# Patient Record
Sex: MALE | Race: WHITE | NOT HISPANIC OR LATINO | Employment: FULL TIME | URBAN - METROPOLITAN AREA
[De-identification: names, ages, dates, MRNs, and addresses within clinical notes are randomized per-mention and may not be internally consistent; named-entity substitution may affect disease eponyms.]

---

## 2021-03-25 ENCOUNTER — IMMUNIZATIONS (OUTPATIENT)
Dept: FAMILY MEDICINE CLINIC | Facility: HOSPITAL | Age: 41
End: 2021-03-25

## 2021-03-25 DIAGNOSIS — Z23 ENCOUNTER FOR IMMUNIZATION: Primary | ICD-10-CM

## 2021-03-25 PROCEDURE — 0001A SARS-COV-2 / COVID-19 MRNA VACCINE (PFIZER-BIONTECH) 30 MCG: CPT

## 2021-03-25 PROCEDURE — 91300 SARS-COV-2 / COVID-19 MRNA VACCINE (PFIZER-BIONTECH) 30 MCG: CPT

## 2021-04-15 ENCOUNTER — IMMUNIZATIONS (OUTPATIENT)
Dept: FAMILY MEDICINE CLINIC | Facility: HOSPITAL | Age: 41
End: 2021-04-15

## 2021-04-15 DIAGNOSIS — Z23 ENCOUNTER FOR IMMUNIZATION: Primary | ICD-10-CM

## 2021-04-15 PROCEDURE — 0002A SARS-COV-2 / COVID-19 MRNA VACCINE (PFIZER-BIONTECH) 30 MCG: CPT

## 2021-04-15 PROCEDURE — 91300 SARS-COV-2 / COVID-19 MRNA VACCINE (PFIZER-BIONTECH) 30 MCG: CPT

## 2022-08-08 ENCOUNTER — APPOINTMENT (EMERGENCY)
Dept: CT IMAGING | Facility: HOSPITAL | Age: 42
End: 2022-08-08
Attending: EMERGENCY MEDICINE
Payer: COMMERCIAL

## 2022-08-08 ENCOUNTER — HOSPITAL ENCOUNTER (EMERGENCY)
Facility: HOSPITAL | Age: 42
Discharge: HOME | End: 2022-08-08
Attending: EMERGENCY MEDICINE
Payer: COMMERCIAL

## 2022-08-08 VITALS
OXYGEN SATURATION: 100 % | DIASTOLIC BLOOD PRESSURE: 80 MMHG | HEART RATE: 76 BPM | HEIGHT: 72 IN | BODY MASS INDEX: 26.41 KG/M2 | WEIGHT: 195 LBS | SYSTOLIC BLOOD PRESSURE: 127 MMHG | RESPIRATION RATE: 18 BRPM | TEMPERATURE: 98.1 F

## 2022-08-08 DIAGNOSIS — M54.2 MUSCULOSKELETAL NECK PAIN: ICD-10-CM

## 2022-08-08 DIAGNOSIS — J32.0 MAXILLARY SINUSITIS: ICD-10-CM

## 2022-08-08 DIAGNOSIS — S06.0XAA CONCUSSION: Primary | ICD-10-CM

## 2022-08-08 PROCEDURE — 99284 EMERGENCY DEPT VISIT MOD MDM: CPT | Performed by: EMERGENCY MEDICINE

## 2022-08-08 PROCEDURE — 96374 THER/PROPH/DIAG INJ IV PUSH: CPT

## 2022-08-08 PROCEDURE — 99284 EMERGENCY DEPT VISIT MOD MDM: CPT

## 2022-08-08 PROCEDURE — 96375 TX/PRO/DX INJ NEW DRUG ADDON: CPT

## 2022-08-08 PROCEDURE — G1004 CDSM NDSC: HCPCS

## 2022-08-08 PROCEDURE — 96361 HYDRATE IV INFUSION ADD-ON: CPT

## 2022-08-08 PROCEDURE — 70450 CT HEAD/BRAIN W/O DYE: CPT

## 2022-08-08 RX ORDER — METHOCARBAMOL 500 MG/1
500 TABLET, FILM COATED ORAL 2 TIMES DAILY
Qty: 10 TABLET | Refills: 0 | Status: SHIPPED | OUTPATIENT
Start: 2022-08-08 | End: 2022-08-08 | Stop reason: SDUPTHER

## 2022-08-08 RX ORDER — AMOXICILLIN 500 MG/1
1000 CAPSULE ORAL EVERY 8 HOURS SCHEDULED
Qty: 42 CAPSULE | Refills: 0 | Status: SHIPPED | OUTPATIENT
Start: 2022-08-08 | End: 2022-08-08 | Stop reason: SDUPTHER

## 2022-08-08 RX ORDER — FLUTICASONE PROPIONATE 50 MCG
1 SPRAY, SUSPENSION (ML) NASAL DAILY
Qty: 16 G | Refills: 0 | Status: SHIPPED | OUTPATIENT
Start: 2022-08-08 | End: 2022-08-08 | Stop reason: SDUPTHER

## 2022-08-08 RX ORDER — KETOROLAC TROMETHAMINE 30 MG/ML
15 INJECTION, SOLUTION INTRAMUSCULAR; INTRAVENOUS ONCE
Status: COMPLETED | OUTPATIENT
Start: 2022-08-08 | End: 2022-08-08

## 2022-08-08 RX ORDER — METOCLOPRAMIDE HYDROCHLORIDE 5 MG/ML
10 INJECTION INTRAMUSCULAR; INTRAVENOUS ONCE
Status: COMPLETED | OUTPATIENT
Start: 2022-08-08 | End: 2022-08-08

## 2022-08-08 RX ORDER — FLUTICASONE PROPIONATE 50 MCG
1 SPRAY, SUSPENSION (ML) NASAL DAILY
Qty: 16 G | Refills: 0 | Status: SHIPPED | OUTPATIENT
Start: 2022-08-08

## 2022-08-08 RX ORDER — AMOXICILLIN 500 MG/1
1000 CAPSULE ORAL EVERY 8 HOURS SCHEDULED
Qty: 42 CAPSULE | Refills: 0 | Status: SHIPPED | OUTPATIENT
Start: 2022-08-08 | End: 2022-08-15

## 2022-08-08 RX ORDER — ACETAMINOPHEN 325 MG/1
975 TABLET ORAL ONCE
Status: COMPLETED | OUTPATIENT
Start: 2022-08-08 | End: 2022-08-08

## 2022-08-08 RX ORDER — DIPHENHYDRAMINE HYDROCHLORIDE 50 MG/ML
50 INJECTION INTRAMUSCULAR; INTRAVENOUS ONCE
Status: COMPLETED | OUTPATIENT
Start: 2022-08-08 | End: 2022-08-08

## 2022-08-08 RX ORDER — METHOCARBAMOL 500 MG/1
1000 TABLET, FILM COATED ORAL ONCE
Status: COMPLETED | OUTPATIENT
Start: 2022-08-08 | End: 2022-08-08

## 2022-08-08 RX ADMIN — ACETAMINOPHEN 975 MG: 325 TABLET ORAL at 16:13

## 2022-08-08 RX ADMIN — KETOROLAC TROMETHAMINE 15 MG: 30 INJECTION, SOLUTION INTRAMUSCULAR; INTRAVENOUS at 16:44

## 2022-08-08 RX ADMIN — DIPHENHYDRAMINE HYDROCHLORIDE 50 MG: 50 INJECTION, SOLUTION INTRAMUSCULAR; INTRAVENOUS at 16:43

## 2022-08-08 RX ADMIN — METHOCARBAMOL 1000 MG: 500 TABLET ORAL at 16:13

## 2022-08-08 RX ADMIN — SODIUM CHLORIDE 1000 ML: 0.9 INJECTION, SOLUTION INTRAVENOUS at 16:18

## 2022-08-08 RX ADMIN — METOCLOPRAMIDE 10 MG: 5 INJECTION, SOLUTION INTRAMUSCULAR; INTRAVENOUS at 16:14

## 2022-08-08 NOTE — ED PROVIDER NOTES
History  Chief Complaint   Patient presents with    Head Injury     Patient arrives through triage  He reports having intense headaches starting on Friday after riding roller coasters at [employer]  He reports LOC on one of the rides  He reports photophobia and intermittent nausea  Patient is a 35year old M who presents with headache  Patient reports that he was on rollercoasters on Friday and hit the left side of his head on the roller coaster, felt nauseated and lightheader  Then went on another roller coaster, and not sure if head hit again, but felt more nauseous and lightheaded and has a brief episode of LOC  Since then has had a headache to the left side of the head, constant, progressively worsening, radiating from the left side of the head to the left side of the neck (neck pain described as constant, worse with movement, spasming in nature, moderate in intensity)  Reports that headache is associated with nausea, difficulty focusing, sensitivity to light and sound  Denies any numbness, tingling, weakness, vomiting, changes in vision, back pain, loss of bowel or bladder control           None       History reviewed  No pertinent past medical history  History reviewed  No pertinent surgical history  History reviewed  No pertinent family history  I have reviewed and agree with the history as documented  E-Cigarette/Vaping    E-Cigarette Use Never User      E-Cigarette/Vaping Substances     Social History     Tobacco Use    Smoking status: Never Smoker    Smokeless tobacco: Never Used   Vaping Use    Vaping Use: Never used   Substance Use Topics    Drug use: Never       Review of Systems   Constitutional: Negative for chills and fever  Eyes: Positive for photophobia  Negative for visual disturbance  Respiratory: Negative for cough and shortness of breath  Cardiovascular: Negative for chest pain and palpitations  Gastrointestinal: Positive for nausea   Negative for abdominal pain, constipation, diarrhea and vomiting  Genitourinary: Negative for dysuria, flank pain and hematuria  Musculoskeletal: Positive for neck pain  Negative for back pain, gait problem and neck stiffness  Skin: Negative for color change and pallor  Neurological: Positive for headaches  Negative for dizziness, seizures, speech difficulty, weakness, light-headedness and numbness  Psychiatric/Behavioral: Negative for confusion  Physical Exam  Physical Exam  Vitals and nursing note reviewed  Constitutional:       General: He is not in acute distress  Appearance: Normal appearance  He is not ill-appearing, toxic-appearing or diaphoretic  HENT:      Head: Normocephalic and atraumatic  Mouth/Throat:      Mouth: Mucous membranes are moist    Eyes:      General: No visual field deficit  Extraocular Movements: Extraocular movements intact  Conjunctiva/sclera: Conjunctivae normal       Pupils: Pupils are equal, round, and reactive to light  Neck:     Cardiovascular:      Rate and Rhythm: Normal rate and regular rhythm  Pulses: Normal pulses  Heart sounds: Normal heart sounds  No murmur heard  Pulmonary:      Effort: Pulmonary effort is normal  No respiratory distress  Breath sounds: Normal breath sounds  No stridor  No wheezing, rhonchi or rales  Chest:      Chest wall: No tenderness  Abdominal:      General: Bowel sounds are normal  There is no distension  Palpations: Abdomen is soft  Tenderness: There is no abdominal tenderness  There is no guarding or rebound  Musculoskeletal:      Cervical back: Normal range of motion and neck supple  No edema, erythema, signs of trauma, rigidity, torticollis or crepitus  Pain with movement and muscular tenderness present  No spinous process tenderness  Normal range of motion  Right lower leg: No edema  Left lower leg: No edema        Comments: No midline c-t-l-spine tenderness, step offs, deformities  Pelvis stable Skin:     General: Skin is warm and dry  Neurological:      General: No focal deficit present  Mental Status: He is alert and oriented to person, place, and time  Mental status is at baseline  GCS: GCS eye subscore is 4  GCS verbal subscore is 5  GCS motor subscore is 6  Cranial Nerves: Cranial nerves are intact  No cranial nerve deficit, dysarthria or facial asymmetry  Sensory: Sensation is intact  Motor: Motor function is intact  No weakness, abnormal muscle tone or pronator drift  Coordination: Coordination is intact  Finger-Nose-Finger Test normal       Gait: Gait is intact  Psychiatric:         Mood and Affect: Mood normal          Behavior: Behavior normal          Vital Signs  ED Triage Vitals [08/08/22 1536]   Temperature Pulse Respirations Blood Pressure SpO2   98 1 °F (36 7 °C) 90 18 140/86 97 %      Temp Source Heart Rate Source Patient Position - Orthostatic VS BP Location FiO2 (%)   Oral Monitor Sitting Left arm --      Pain Score       6           Vitals:    08/08/22 1536 08/08/22 1657   BP: 140/86 127/80   Pulse: 90 76   Patient Position - Orthostatic VS: Sitting Lying         Visual Acuity      ED Medications  Medications   sodium chloride 0 9 % bolus 1,000 mL (0 mL Intravenous Stopped 8/8/22 1720)   metoclopramide (REGLAN) injection 10 mg (10 mg Intravenous Given 8/8/22 1614)   methocarbamol (ROBAXIN) tablet 1,000 mg (1,000 mg Oral Given 8/8/22 1613)   acetaminophen (TYLENOL) tablet 975 mg (975 mg Oral Given 8/8/22 1613)   ketorolac (TORADOL) injection 15 mg (15 mg Intravenous Given 8/8/22 1644)   diphenhydrAMINE (BENADRYL) injection 50 mg (50 mg Intravenous Given 8/8/22 1643)       Diagnostic Studies  Results Reviewed     None                 CT head without contrast   Final Result by Neal 6, DO (08/08 1625)      No acute intracranial abnormality  Moderate sinus opacification left maxillary sinus with air-fluid level present    Acute left maxillary sinusitis should be considered  Workstation performed: [id number]                    Procedures  Procedures         ED Course                               SBIRT 22yo+    Flowsheet Row Most Recent Value   SBIRT (25 yo +)    In order to provide better care to our patients, we are screening all of our patients for alcohol and drug use  Would it be okay to ask you these screening questions? Yes Filed at: 08/08/2022 1539   Initial Alcohol Screen: US AUDIT-C     1  How often do you have a drink containing alcohol? 0 Filed at: 08/08/2022 1539   2  How many drinks containing alcohol do you have on a typical day you are drinking? 0 Filed at: 08/08/2022 1539   3a  Male UNDER 65: How often do you have five or more drinks on one occasion? 0 Filed at: 08/08/2022 1539   3b  FEMALE Any Age, or MALE 65+: How often do you have 4 or more drinks on one occassion? 0 Filed at: 08/08/2022 1539   Audit-C Score 0 Filed at: 08/08/2022 1539   LAURIE: How many times in the past year have you    Used an illegal drug or used a prescription medication for non-medical reasons? Never Filed at: 08/08/2022 1539                    MDM  Number of Diagnoses or Management Options  Concussion  Maxillary sinusitis  Musculoskeletal neck pain  Diagnosis management comments: Assessment and Plan:   35year old male p/w post-traumatic headache  Non-focal, neuro exam  CT head negative for acute intracranial hemorrhage, but does show findings suggestive of sinusitis  I reviewed findings with patient and he does actually report that he has had a stuffy nose/ congestion for the last 1 5 weeks  Denies F/C/sore throat/cough/cp/sob  Will prescribe flonase and amoxicillin for the sinusitis and recommend f/u with ENT  Robaxin for MSK neck pain  No driving or work until cleared by PCP or concussion clinic  Counseled regarding return precautions         Disposition  Final diagnoses:   Concussion   Maxillary sinusitis   Musculoskeletal neck pain Time reflects when diagnosis was documented in both MDM as applicable and the Disposition within this note     Time User Action Codes Description Comment    8/8/2022  4:33 PM Diane Thomas Add [S06 0X9A] Concussion     8/8/2022  4:37 PM Haven Began [J32 0] Maxillary sinusitis     8/8/2022  5:00 PM Diane Breeding Add [M54 2] Musculoskeletal neck pain       ED Disposition     ED Disposition   Discharge    Condition   Stable    Date/Time   Mon Aug 8, 2022  4:33 PM    Comment   Vergil Divers discharge to home/self care                 Follow-up Information     Follow up With Specialties Details Why Contact Info Additional [address] Jr vd, DO Family Medicine Schedule an appointment as soon as possible for a visit in 2 days for re-evaluation [address] Emergency Department Emergency Medicine Go to  As needed, If symptoms worsen, for re-evaluation [address] [place], [id number]  [address] [facility] Emergency Department, [address] [facility] , [address] [place] [address]    Allison Valero MD Otolaryngology Schedule an appointment as soon as possible for a visit in 1 week for re-evaluation [address] Krista Rose [address] [facility]  [phone]             Discharge Medication List as of 8/8/2022  5:00 PM      START taking these medications    Details   amoxicillin (AMOXIL) 500 mg capsule Take 2 capsules (1,000 mg total) by mouth every 8 (eight) hours for 7 days, Starting Mon 8/8/2022, Until Mon 8/15/2022, Normal      fluticasone (FLONASE) 50 mcg/act nasal spray 1 spray into each nostril daily, Starting Mon 8/8/2022, Normal      methocarbamol (ROBAXIN) 500 mg tablet Take 1 tablet (500 mg total) by mouth 2 (two) times a day for 5 days, Starting Mon 8/8/2022, Until Sat 8/13/2022, Normal                 PDMP Review     None          ED Provider  Electronically Signed by           Cesar Brewster DO  08/08/22 8506

## 2022-08-08 NOTE — Clinical Note
Paula Lamberto was seen and treated in our emergency department on 8/8/2022  Diagnosis: Concussion    [facility]iste    He may return on this date:     No work or driving until cleared by primary care doctor or concussion clinic     If you have any questions or concerns, please don't hesitate to call        Rachel Vale DO    ______________________________           _______________          _______________  Hospital Representative                              Date                                Time

## 2022-08-08 NOTE — Clinical Note
Tip Garcia was seen and treated in our emergency department on 8/8/2022  Diagnosis: Concussion    Essence Negron    He may return on this date:     No work or driving until cleared by primary care doctor or concussion clinic     If you have any questions or concerns, please don't hesitate to call        Sujey Rebolledo,     ______________________________           _______________          _______________  Hospital Representative                              Date                                Time

## 2022-08-08 NOTE — DISCHARGE INSTRUCTIONS
Your CT scan today shows:   No acute intracranial abnormality  Moderate sinus opacification left maxillary sinus with air-fluid level present  Acute left maxillary sinusitis should be considered

## 2022-08-08 NOTE — ED NOTES
Patient transported to [address] [place] [address],[address], [facility]  08/08/22 8497

## 2022-08-11 ENCOUNTER — EVALUATION (OUTPATIENT)
Dept: PHYSICAL THERAPY | Facility: CLINIC | Age: 42
End: 2022-08-11
Payer: COMMERCIAL

## 2022-08-11 DIAGNOSIS — S06.0XAA CONCUSSION: ICD-10-CM

## 2022-08-11 DIAGNOSIS — S06.0X1S CONCUSSION WITH LOSS OF CONSCIOUSNESS OF 30 MINUTES OR LESS, SEQUELA (HCC): ICD-10-CM

## 2022-08-11 PROCEDURE — 97162 PT EVAL MOD COMPLEX 30 MIN: CPT

## 2022-08-11 NOTE — PROGRESS NOTES
PT Evaluation     Today's date: 2022  Patient name: Dylan Holguin  : 1980  MRN: [id number]  Referring provider: Caro Donahue DO  Dx:   Encounter Diagnosis     ICD-10-CM    1  Concussion  S06  0X9A Ambulatory Referral to Comprehensive Concussion Program       Start Time: 1100  Stop Time: 1152  Total time in clinic (min): 52 minutes    Assessment  Assessment details: Patient is a 35y o  year old male presenting to [facility] s/p diagnosis of concussion  Patient also notes recent diagnosis of sinusitis which he is currently taking antibiotics for  VOMs did not reproduce any symptoms, FGA unremarkable  PCSS is 21/132 indicating mild symptom inventory  Current symptoms may be exacerbated by sinusitis and discussed this with the patient  Educated patient on concussion healing time, symptom tolerance with ADLs/work/hobbies, gradual return to normalcy, and benefits of subthreshold aerobic activity and sleep hygiene  Will follow up with patient next week over phone to decide if further intervention from PT is necessary and re-assess  He will follow up with PCP regarding driving  Thank you for your referral     Impairments: abnormal gait, activity intolerance, impaired balance, impaired physical strength, lacks appropriate home exercise program and safety issue     Prognosis: good    Goals  Will make goals as needed if seen for another session  Plan  Patient would benefit from: skilled physical therapy  Planned therapy interventions: neuromuscular re-education, manual therapy, patient education, home exercise program, therapeutic exercise, therapeutic activities and gait training  Frequency: 2x week  Plan of Care beginning date: 2022  Plan of Care expiration date: 10/10/2022  Treatment plan discussed with: patient        Subjective Evaluation    History of Present Illness  Mechanism of injury: Present at PT: On standing roller coaster  It will start shaking back and forth between   Had episode of blacking out on a seated roller coaster  Woke up next day and felt okay  Went for a jog with his son and felt okay  Slept it off the next day, no big deal  Went to a concert the subsequent night and thought nothing of it  Woke up next morning and had excruciating head pain  Tried medication with no help  Went to urgent care, they didn't have CT scan  Sent to [facility] ED got dx of acute sinusitis  Feels like he has a lot of pressure in frontal L head and eyes  Looking at screens does tend to exacerbate sx  Focusing on one thing for a little bit he has to look away  2 days through antibiotics  Feeling decent  Uses muscle relaxer to help him sleep last night  Pain  Current pain ratin  At best pain ratin  At worst pain ratin  Location: L frontal headache    Social Support  Lives in: multiple-level home    Employment status: working ([employer])    Diagnostic Tests  CT scan: abnormal    FCE comments: See CT  Treatments  No previous or current treatments  Previous treatment: medication (antibiotics)  Patient Goals  Patient goals for therapy: independence with ADLs/IADLs, return to sport/leisure activities and return to work  Patient goal: Improve sx  Objective     Concurrent Complaints  Positive for poor concentration  Negative for tinnitus and hearing loss    Strength/Myotome Testing     Left Hip   Planes of Motion   Flexion: 5  Extension: 5  Abduction: 5    Right Hip   Planes of Motion   Flexion: 5  Extension: 5  Abduction: 5    Left Knee   Extension: 5    Right Knee   Extension: 5    Left Ankle/Foot   Dorsiflexion: 5  Plantar flexion: 5    Right Ankle/Foot   Dorsiflexion: 5  Plantar flexion: 5  Neuro Exam:     Dizziness  Negative for vertigo, light-headedness and diplopia  Cervical exam   Ligament Laxity Testing   Alar ligament: WNL  Sharp Kryie:  WNL  Modified VBI   Cervical palpation: TTP around C4-5 UT    Functional outcomes   Functional outcome gait comment: Unremarkable      VOMS (Vestibular/Ocular Motor Screen)--no symptoms unless noted, normal less than 2/10 symptoms for each     Baseline symptoms (kaylin presence of headache 1, dizziness 1, nausea 0, fogginess 1)      Smooth pursuit Normal     Saccades (horizontal) Normal     Saccades (vertical)  Normal     Convergence (near point)--Normal trial 1: 13 cm, trial 2: 6 cm, trial 3: 9 cm     VOR (horizontal) Normal     VOR (vertical) Normal     Visual Motion Sensitivity Normal           Precautions: Hx sinusitis  Re-eval Date: 09/11/2022    Date 8/11       Visit Count 1       FOTO See IE       Pain In See IE       Pain Out                Testing        FGA 30       PCSS 21/132       VOMS See VOMS                       Neuro Re-Ed                                                                Ther Ex                                                                        Ther Activity                        Gait Training                        Modalities         prn